# Patient Record
Sex: MALE | NOT HISPANIC OR LATINO | URBAN - METROPOLITAN AREA
[De-identification: names, ages, dates, MRNs, and addresses within clinical notes are randomized per-mention and may not be internally consistent; named-entity substitution may affect disease eponyms.]

---

## 2017-02-20 ENCOUNTER — IMPORTED ENCOUNTER (OUTPATIENT)
Dept: URBAN - METROPOLITAN AREA CLINIC 1 | Facility: CLINIC | Age: 27
End: 2017-02-20

## 2017-02-20 PROBLEM — H40.013: Noted: 2017-02-20

## 2017-02-20 PROBLEM — H04.123: Noted: 2017-02-20

## 2017-02-20 PROCEDURE — 92014 COMPRE OPH EXAM EST PT 1/>: CPT

## 2017-02-20 NOTE — PATIENT DISCUSSION
1.  Glaucoma Suspect OU : (CD 0.8 OU) Past w/u negative. Neg Fm Hx. IOP stable on no meds. Observe. Patient is considered Low Risk2. Dry Eyes OU patient photophobic today etiology uncertain - Use PF ATs QID OU routinely  (Samples of PF Refresh Optive Advanced given) . Begin Lotemax BID OU till seen (Samples given) Letter to PCP Return for an appointment in 3 week 10 with Dr. Ila Garcia.

## 2017-03-13 ENCOUNTER — IMPORTED ENCOUNTER (OUTPATIENT)
Dept: URBAN - METROPOLITAN AREA CLINIC 1 | Facility: CLINIC | Age: 27
End: 2017-03-13

## 2017-03-13 PROBLEM — H04.123: Noted: 2017-03-13

## 2017-03-13 PROCEDURE — 92014 COMPRE OPH EXAM EST PT 1/>: CPT

## 2017-03-13 NOTE — PATIENT DISCUSSION
1.  Dry Eyes OU -- Improvement today. Recommended to patient to use Artificial Tears BID-TID  OU2. Return for an appointment in 1 year for 30.  with Dr. Kerry Fountain

## 2018-04-30 ENCOUNTER — IMPORTED ENCOUNTER (OUTPATIENT)
Dept: URBAN - METROPOLITAN AREA CLINIC 1 | Facility: CLINIC | Age: 28
End: 2018-04-30

## 2018-04-30 PROBLEM — H40.013: Noted: 2018-04-30

## 2018-04-30 PROBLEM — H04.123: Noted: 2018-04-30

## 2018-04-30 PROCEDURE — 92014 COMPRE OPH EXAM EST PT 1/>: CPT

## 2018-04-30 NOTE — PATIENT DISCUSSION
1.  Glaucoma Suspect OU (0.8 OU): Stable IOP and C/D OU. Past w/u (-). Patient is considered Low Risk. Condition was discussed with patient and patient understands. Will continue to monitor patient for any progression in condition. Patient was advised to call us with any problems questions or concerns. 2.  Dry Eyes OU- Controlled. The continuation of artificial tears were recommended. 3.  H/o PRK OU4. Return for an appointment for a 30/OCT (if OCT normal next year will follow PRN) in 1 year with Dr. Marissa Echols.

## 2019-07-05 ENCOUNTER — IMPORTED ENCOUNTER (OUTPATIENT)
Dept: URBAN - METROPOLITAN AREA CLINIC 1 | Facility: CLINIC | Age: 29
End: 2019-07-05

## 2019-07-05 PROBLEM — H40.013: Noted: 2019-07-05

## 2019-07-05 PROCEDURE — 92133 CPTRZD OPH DX IMG PST SGM ON: CPT

## 2019-07-05 PROCEDURE — 92014 COMPRE OPH EXAM EST PT 1/>: CPT

## 2019-07-05 NOTE — PATIENT DISCUSSION
1.  Glaucoma Suspect OU (0.8 OU) OCT WNL OU Today. IOP stable on no gtts. Cont to observe on no gtts. 2.  Dry Eyes OU- Cont ATs BID OU routinely. 3.  H/o PRK OULetter to PCP MRx deferredReturn for an appointment in 5 yr 27 OCT with Dr. Lacey Alcantara.

## 2020-08-03 ENCOUNTER — IMPORTED ENCOUNTER (OUTPATIENT)
Dept: URBAN - METROPOLITAN AREA CLINIC 1 | Facility: CLINIC | Age: 30
End: 2020-08-03

## 2020-08-03 PROBLEM — H17.822: Noted: 2020-08-03

## 2020-08-03 PROBLEM — H10.32: Noted: 2020-08-03

## 2020-08-03 PROBLEM — H04.122: Noted: 2020-08-03

## 2020-08-03 PROBLEM — H16.142: Noted: 2020-08-03

## 2020-08-03 PROCEDURE — 92012 INTRM OPH EXAM EST PATIENT: CPT

## 2020-08-03 NOTE — PATIENT DISCUSSION
1.  Unspecified Acute Conjunctivitis OS- D/C red eye drops. Having rebound from red eye drops. Begin Tobradex OS BID2. AMANDO w/ PEK OS-The use/continuation of artificial tears were recommended. 3.  Peripheral Corneal Scar OS - Symptoms suggest he may also bee having recurrent erosion OS. 4.  Return for an appointment in 1 week for 10. with Dr. Najma Love.

## 2020-08-10 ENCOUNTER — IMPORTED ENCOUNTER (OUTPATIENT)
Dept: URBAN - METROPOLITAN AREA CLINIC 1 | Facility: CLINIC | Age: 30
End: 2020-08-10

## 2020-08-10 PROBLEM — H04.123: Noted: 2020-08-10 | Resolved: 2020-08-10

## 2020-08-10 PROBLEM — H04.123: Noted: 2020-08-10

## 2020-08-10 PROBLEM — H10.32: Status: RESOLVED | Noted: 2020-08-10 | Resolved: 2020-08-10

## 2020-08-10 PROBLEM — H10.32: Noted: 2020-08-10

## 2020-08-10 PROCEDURE — 92012 INTRM OPH EXAM EST PATIENT: CPT

## 2020-08-10 NOTE — PATIENT DISCUSSION
1.  Unspecified Acute Conjunctivitis OS:resolved. Tobradex qd x3 days then D/C.2. Dry Eyes OU -The use/continuation of artificial tears were recommended. 3.  Return for an appointment in 1 year for 30 with Dr. Mikey Rai.

## 2022-04-02 ASSESSMENT — VISUAL ACUITY
OD_SC: J1+
OS_CC: 20/20
OS_CC: 20/20
OS_CC: 20/25+1
OD_CC: 20/20
OS_CC: 20/20-1
OD_CC: 20/20-1
OD_CC: 20/20
OD_CC: 20/30
OD_CC: 20/20-1
OS_SC: J1+
OD_CC: 20/20
OS_CC: 20/20
OS_CC: 20/25

## 2022-04-02 ASSESSMENT — TONOMETRY
OS_IOP_MMHG: 16
OS_IOP_MMHG: 14
OS_IOP_MMHG: 18
OD_IOP_MMHG: 16
OD_IOP_MMHG: 14
OD_IOP_MMHG: 16
OS_IOP_MMHG: 14
OD_IOP_MMHG: 18
OD_IOP_MMHG: 14
OS_IOP_MMHG: 15
OD_IOP_MMHG: 17
OS_IOP_MMHG: 16

## 2022-09-19 ENCOUNTER — EMERGENCY VISIT (OUTPATIENT)
Dept: URBAN - METROPOLITAN AREA CLINIC 1 | Facility: CLINIC | Age: 32
End: 2022-09-19

## 2022-09-19 DIAGNOSIS — S05.01XA: ICD-10-CM

## 2022-09-19 PROCEDURE — 92012 INTRM OPH EXAM EST PATIENT: CPT

## 2022-09-19 ASSESSMENT — VISUAL ACUITY
OD_SC: 20/20
OS_SC: 20/20

## 2022-09-19 ASSESSMENT — TONOMETRY
OD_IOP_MMHG: 18
OS_IOP_MMHG: 18

## 2022-09-19 NOTE — PATIENT DISCUSSION
Diagnosis discussed with the patient. Advised the patient to use his Ofloxacin BID OU through the rest of the week then D/C. Instructed the patient to also use AT'S TID-QID OD. F/u 1 year 3-0.